# Patient Record
Sex: MALE | Race: WHITE | Employment: PART TIME | URBAN - METROPOLITAN AREA
[De-identification: names, ages, dates, MRNs, and addresses within clinical notes are randomized per-mention and may not be internally consistent; named-entity substitution may affect disease eponyms.]

---

## 2018-08-27 ENCOUNTER — HOSPITAL ENCOUNTER (EMERGENCY)
Age: 28
Discharge: HOME | End: 2018-08-28
Attending: EMERGENCY MEDICINE
Payer: OTHER GOVERNMENT

## 2018-08-27 DIAGNOSIS — S61.011A LACERATION OF RIGHT THUMB WITHOUT FOREIGN BODY WITHOUT DAMAGE TO NAIL, INITIAL ENCOUNTER: Primary | ICD-10-CM

## 2018-08-27 PROCEDURE — 75810000293 HC SIMP/SUPERF WND  RPR

## 2018-08-27 PROCEDURE — 99283 EMERGENCY DEPT VISIT LOW MDM: CPT

## 2018-08-27 NOTE — LETTER
NOTIFICATION RETURN TO WORK / SCHOOL 
 
8/28/2018 12:07 PM 
 
Mr. Farhana Andrade [address] [place] [address] Dr Nick Bello [address] [place], [place] [id number] To Whom It May Concern: 
 
Farhana Andrade is currently under the care of [facility] EMERGENCY DEPT. He will return to work/school on: 8/29/18 If there are questions or concerns please have the patient contact our office.  
 
 
 
Sincerely, 
 
Dr. Cesia Estrada M.D./Jana Torres R.N

## 2018-08-28 VITALS
HEIGHT: 73 IN | OXYGEN SATURATION: 99 % | BODY MASS INDEX: 25.18 KG/M2 | SYSTOLIC BLOOD PRESSURE: 101 MMHG | DIASTOLIC BLOOD PRESSURE: 59 MMHG | TEMPERATURE: 97.9 F | HEART RATE: 57 BPM | RESPIRATION RATE: 14 BRPM | WEIGHT: 190 LBS

## 2018-08-28 PROCEDURE — 77030002986 HC SUT PROL J&J -A

## 2018-08-28 PROCEDURE — 74011250637 HC RX REV CODE- 250/637: Performed by: EMERGENCY MEDICINE

## 2018-08-28 PROCEDURE — 77030008304 HC SPLNT FNGR ALUM DERY -A

## 2018-08-28 RX ORDER — ACETAMINOPHEN 325 MG/1
650 TABLET ORAL
Qty: 20 TAB | Refills: 0 | Status: SHIPPED | OUTPATIENT
Start: 2018-08-28

## 2018-08-28 RX ORDER — CEPHALEXIN 500 MG/1
500 CAPSULE ORAL 3 TIMES DAILY
Qty: 15 CAP | Refills: 0 | Status: SHIPPED | OUTPATIENT
Start: 2018-08-28 | End: 2018-09-02

## 2018-08-28 RX ORDER — CEPHALEXIN 250 MG/1
500 CAPSULE ORAL
Status: COMPLETED | OUTPATIENT
Start: 2018-08-28 | End: 2018-08-28

## 2018-08-28 RX ORDER — IBUPROFEN 600 MG/1
600 TABLET ORAL
Qty: 20 TAB | Refills: 0 | Status: SHIPPED | OUTPATIENT
Start: 2018-08-28

## 2018-08-28 RX ADMIN — CEPHALEXIN 500 MG: 250 CAPSULE ORAL at 03:03

## 2018-08-28 NOTE — ED TRIAGE NOTES
Pt arrives ambulatory to ED with c\o lac to right 1st digit, pt is able to make needs known speaking in complete sentences, pt in nad at this time

## 2018-08-28 NOTE — DISCHARGE INSTRUCTIONS

## 2018-08-28 NOTE — ED NOTES
PO Keflex provided- see MAR. Pt d/c home with family member/friend. Observe splint to RT thumb intact- placed by Ash Bryson EDT. D/C instructions/RXs reviewed with pt/understanding acknowledged by him. Pt ambulatory upon d/c home. NAD observed upon d/c home. Splint CDI.

## 2018-08-28 NOTE — ED PROVIDER NOTES
EMERGENCY DEPARTMENT HISTORY AND PHYSICAL EXAM    Date: 8/27/2018  Patient Name: Marito Cai    History of Presenting Illness     Chief Complaint   Patient presents with    Laceration         History Provided By: Patient    Chief Complaint: Laceration  Duration: 2 Hours  Timing:  Acute  Location: right thumb  Severity: N/A  Modifying Factors: No modifying factors  Associated Symptoms: right thumb pain    Additional History (Context):   12:26 AM   Marito Cai is a 33 y.o. male with PMHx presents to the emergency department C/O right thumb laceration onset 2 hours. Associated sxs include right thumb pain. Pt states he was at work at [employer] when he jammed his finger inside the freezer. Pt is a 1/2-1 pack a day smoker. Pt does not believe his Tetanus UTD. Pt denies any other sxs or complaints. PCP: None        Past History     Past Medical History:  History reviewed. No pertinent past medical history. Past Surgical History:  History reviewed. No pertinent surgical history. Family History:  History reviewed. No pertinent family history. Social History:  Social History   Substance Use Topics    Smoking status: Current Every Day Smoker     Packs/day: 1.00    Smokeless tobacco: Never Used    Alcohol use No       Allergies:  No Known Allergies      Review of Systems   Review of Systems   Constitutional: Negative. Musculoskeletal: Positive for arthralgias. Skin: Positive for wound. All other systems reviewed and are negative. Physical Exam     Vitals:    08/27/18 2302 08/28/18 0204   BP: 115/81 101/59   Pulse: 80 (!) 57   Resp: 18 14   Temp: 98.2 °F (36.8 °C) 97.9 °F (36.6 °C)   SpO2: 100% 99%   Weight: 86.2 kg (190 lb)    Height: 6' 1\" (1.854 m)      Physical Exam   Constitutional: He is oriented to person, place, and time. He appears well-developed and well-nourished. No distress. HENT:   Head: Normocephalic and atraumatic.    Right Ear: External ear normal.   Left Ear: External ear normal.   Mouth/Throat: Oropharynx is clear and moist. No oropharyngeal exudate. Eyes: Conjunctivae and EOM are normal. Pupils are equal, round, and reactive to light. No scleral icterus. No pallor   Neck: Normal range of motion. Neck supple. No JVD present. No tracheal deviation present. No thyromegaly present. Cardiovascular: Normal rate, regular rhythm and normal heart sounds. Pulmonary/Chest: Effort normal and breath sounds normal. No stridor. No respiratory distress. Abdominal: Soft. Bowel sounds are normal. He exhibits no distension. There is no tenderness. There is no rebound and no guarding. Musculoskeletal: Normal range of motion. He exhibits no edema or tenderness. 3 cm laceration over the dorsum of his dominant right thumb  Able to full extend thumb without difficulties     Lymphadenopathy:     He has no cervical adenopathy. Neurological: He is alert and oriented to person, place, and time. He has normal reflexes. No cranial nerve deficit. Coordination normal.   Skin: Skin is warm and dry. No rash noted. He is not diaphoretic. No erythema. Psychiatric: He has a normal mood and affect. His behavior is normal. Judgment and thought content normal.   Nursing note and vitals reviewed. Diagnostic Study Results     Labs -   No results found for this or any previous visit (from the past 12 hour(s)). Radiologic Studies -   No orders to display     CT Results  (Last 48 hours)    None        CXR Results  (Last 48 hours)    None            Medical Decision Making   I am the first provider for this patient. I reviewed the vital signs, available nursing notes, past medical history, past surgical history, family history and social history. Vital Signs-Reviewed the patient's vital signs.     Pulse Oximetry Analysis - 100% on RA     Cardiac Monitor:  Rate: 80 bpm  Rhythm: NSR    Records Reviewed: Nursing Notes and Old Medical Records    Provider Notes (Medical Decision Making):   DDx: possible but unlikely to have foreign body or active infection. Tetanus is likely 9 years old, will update  Procedures:  WOUND REPAIR  Date/Time: 8/28/2018 2:39 AM  Performed by: attendingPreparation: skin prepped with Betadine  Pre-procedure re-eval: Immediately prior to the procedure, the patient was reevaluated and found suitable for the planned procedure and any planned medications. Location details: right thumb  Wound length:2.6 - 7.5 cm (3 cm)  Anesthesia: local infiltration    Anesthesia:  Local Anesthetic: lidocaine 1% without epinephrine  Foreign bodies: no foreign bodies  Irrigation solution: saline  Irrigation method: syringe  Debridement: none  Skin closure: Prolene (5-0)  Number of sutures: 6  Technique: simple and interrupted  Approximation: close  Dressing: antibiotic ointment, non-adhesive packing strip and splint  Patient tolerance: Patient tolerated the procedure well with no immediate complications  My total time at bedside, performing this procedure was 16-30 minutes. ED Course:   12:26 AM Initial assessment performed. The patients presenting problems have been discussed, and they are in agreement with the care plan formulated and outlined with them. I have encouraged them to ask questions as they arise throughout their visit. Diagnosis and Disposition       DISCHARGE NOTE:  2:49 AM  Howie Meigs [place]'s  results have been reviewed with him. He has been counseled regarding his diagnosis, treatment, and plan. He verbally conveys understanding and agreement of the signs, symptoms, diagnosis, treatment and prognosis and additionally agrees to follow up as discussed. He also agrees with the care-plan and conveys that all of his questions have been answered.   I have also provided discharge instructions for him that include: educational information regarding their diagnosis and treatment, and list of reasons why they would want to return to the ED prior to their follow-up appointment, should his condition change. He has been provided with education for proper emergency department utilization. CLINICAL IMPRESSION:    1. Laceration of right thumb without foreign body without damage to nail, initial encounter        Discussion:    PLAN:  1. D/C Home  2. Current Discharge Medication List      START taking these medications    Details   cephALEXin (KEFLEX) 500 mg capsule Take 1 Cap by mouth three (3) times daily for 5 days. Qty: 15 Cap, Refills: 0      acetaminophen (TYLENOL) 325 mg tablet Take 2 Tabs by mouth every four (4) hours as needed for Pain. Qty: 20 Tab, Refills: 0      ibuprofen (MOTRIN) 600 mg tablet Take 1 Tab by mouth every six (6) hours as needed for Pain. Qty: 20 Tab, Refills: 0           3. Follow-up Information     Follow up With Details Comments Contact Info    [facility]  For primary care follow up [id number] [facility], [address] [facility] [address] [facility] [address]    [facility] EM[facility]  As needed, if symptoms worsen and, For suture removal [address] [id number]  [phone]        _______________________________    Attestations: This note is prepared by Scott Rico, acting as Scribe for Jeanette. Lilian Altman MD.    Jeanette. Lilian Altman MD:  The scribe's documentation has been prepared under my direction and personally reviewed by me in its entirety.   I confirm that the note above accurately reflects all work, treatment, procedures, and medical decision making performed by me.  _______________________________

## 2018-08-28 NOTE — ED NOTES
Assume care of pt from triage. Pt presents to ED with a mod size laceration to his RT thumb- small amt of bleeding observed (dry gauze provided). Pt voices to happen while at work. Pt believes his Tdap is up to date, but not \"100 % sure. \"